# Patient Record
Sex: MALE | Race: WHITE
[De-identification: names, ages, dates, MRNs, and addresses within clinical notes are randomized per-mention and may not be internally consistent; named-entity substitution may affect disease eponyms.]

---

## 2020-11-30 ENCOUNTER — HOSPITAL ENCOUNTER (EMERGENCY)
Dept: HOSPITAL 8 - ED | Age: 65
LOS: 1 days | Discharge: HOME | End: 2020-12-01
Payer: COMMERCIAL

## 2020-11-30 VITALS — HEIGHT: 74 IN | BODY MASS INDEX: 30.1 KG/M2 | WEIGHT: 234.57 LBS

## 2020-11-30 DIAGNOSIS — U07.1: Primary | ICD-10-CM

## 2020-11-30 DIAGNOSIS — J06.9: ICD-10-CM

## 2020-11-30 DIAGNOSIS — R94.31: ICD-10-CM

## 2020-11-30 DIAGNOSIS — E11.9: ICD-10-CM

## 2020-11-30 PROCEDURE — 96365 THER/PROPH/DIAG IV INF INIT: CPT

## 2020-11-30 PROCEDURE — M0239 BAMLANIVIMAB-XXXX INFUSION: HCPCS

## 2020-11-30 PROCEDURE — 96366 THER/PROPH/DIAG IV INF ADDON: CPT

## 2020-11-30 PROCEDURE — 93005 ELECTROCARDIOGRAM TRACING: CPT

## 2020-11-30 PROCEDURE — 99284 EMERGENCY DEPT VISIT MOD MDM: CPT

## 2020-11-30 NOTE — NUR
Rounding on pt. Pt has no complaints. No sign of medication reactions with 
bamlanivimab. VSS. WCTM.

## 2020-12-01 VITALS — DIASTOLIC BLOOD PRESSURE: 83 MMHG | SYSTOLIC BLOOD PRESSURE: 150 MMHG

## 2020-12-01 NOTE — NUR
VSS. PT HAS NO SIGNS OF ADVERSE REACTION TO THE MEDICATION. AMBULATORY. 
DISCHARGE INSTRUCTIONS REVIEWED. NO QUESTIONS AT THIS TIME. PT HAS PULSE OX AT 
HOME TO MONITOR O2 SAT.